# Patient Record
Sex: FEMALE | Race: OTHER | NOT HISPANIC OR LATINO | URBAN - METROPOLITAN AREA
[De-identification: names, ages, dates, MRNs, and addresses within clinical notes are randomized per-mention and may not be internally consistent; named-entity substitution may affect disease eponyms.]

---

## 2022-01-24 ENCOUNTER — EMERGENCY (EMERGENCY)
Facility: HOSPITAL | Age: 27
LOS: 1 days | Discharge: HOME | End: 2022-01-24
Attending: EMERGENCY MEDICINE
Payer: MEDICAID

## 2022-01-24 VITALS
RESPIRATION RATE: 18 BRPM | SYSTOLIC BLOOD PRESSURE: 107 MMHG | TEMPERATURE: 98 F | HEART RATE: 84 BPM | DIASTOLIC BLOOD PRESSURE: 72 MMHG | WEIGHT: 121.25 LBS | OXYGEN SATURATION: 98 %

## 2022-01-24 PROCEDURE — 99282 EMERGENCY DEPT VISIT SF MDM: CPT | Mod: 25

## 2022-01-24 PROCEDURE — 10120 INC&RMVL FB SUBQ TISS SMPL: CPT

## 2022-01-24 PROCEDURE — 99283 EMERGENCY DEPT VISIT LOW MDM: CPT | Mod: 25

## 2022-01-25 RX ORDER — BACITRACIN ZINC 500 UNIT/G
1 OINTMENT IN PACKET (EA) TOPICAL ONCE
Refills: 0 | Status: DISCONTINUED | OUTPATIENT
Start: 2022-01-25 | End: 2022-01-28

## 2022-01-25 NOTE — ED PROCEDURE NOTE - CPROC ED LOCAL ANESTHESIA1
Addended by: RADHA PATHAK on: 8/30/2021 04:29 PM     Modules accepted: Orders    
Addended by: RADHA PATHAK on: 9/9/2021 10:27 AM     Modules accepted: Orders    
1cc/1% lidocaine

## 2022-01-25 NOTE — ED PROVIDER NOTE - NSFOLLOWUPINSTRUCTIONS_ED_ALL_ED_FT
Return if you have pain, bleeding, redness, swelling any concerns.  Pt is well appearing, has no new complaints and able to walk with normal gait. Pt is stable for discharge and follow up with medical doctor. Pt educated on care and need for follow up. Discussed anticipatory guidance and return precautions. Questions answered. I had a detailed discussion with the patient and/or guardian regarding the historical points, exam findings, and any diagnostic results supporting the discharge diagnosis.

## 2022-01-25 NOTE — ED PROVIDER NOTE - CROS ED MARK PERT SYS NEG
Pee Ayala,    It was a pleasure to see you today at the [facility].     My recommendations after this visit include:    1.  Please try isosorbide mononitrate 30 mg each day.  Try this between 5 and 6 PM at night.  We want to see if this helps your significant fatigue in the early evening when you are taking the dog out.  Please call Mar Fink RN in 2 weeks to let her know if this treatment has worked.  Her phone number is [phone]    2.  If this medicine does not help at all, we will discontinue it.    3.  We will set up a follow-up in approximately 3 months.        Tres Talbert          
kerry all pertinent systems negative

## 2022-01-25 NOTE — ED PROVIDER NOTE - OBJECTIVE STATEMENT
Pt has ear ring stud stuck inside right ear lobe since yesterday morning. No fever, pt is not on anticoagulants.  Strd required removal with 1% lidocaine and small anterior incision with #11 scalpel. no complications.

## 2022-01-25 NOTE — ED PROVIDER NOTE - NSFOLLOWUPCLINICS_GEN_ALL_ED_FT
[facility]  Internal Medicine  [address] [place] [address]  Phone: [phone]  Fax: [phone]

## 2022-01-25 NOTE — ED PROVIDER NOTE - CLINICAL SUMMARY MEDICAL DECISION MAKING FREE TEXT BOX
Pt is neurovascularly intact s/p removal or earring stud.  Pt is well appearing, has no new complaints and able to walk with normal gait. Pt is stable for discharge and follow up with medical doctor. Pt educated on care and need for follow up. Discussed anticipatory guidance and return precautions. Questions answered. I had a detailed discussion with the patient and/or guardian regarding the historical points, exam findings, and any diagnostic results supporting the discharge diagnosis.

## 2022-01-25 NOTE — ED PROVIDER NOTE - PATIENT PORTAL LINK FT
You can access the FollowMyHealth Patient Portal offered by [facility] by registering at the following website: [email or web address]. By joining [employer]’s FollowMyHealth portal, you will also be able to view your health information using other applications (apps) compatible with our system.

## 2023-10-11 NOTE — ED PROVIDER NOTE - EYES, MLM
“Patient's name, , procedure and correct site were confirmed during the [place] Timeout.” “Patient's name, , procedure and correct site were confirmed during the [place] Timeout.” “Patient's name, , procedure and correct site were confirmed during the [place] Timeout.” Clear bilaterally, pupils equal, round and reactive to light.

## 2023-12-05 ENCOUNTER — EMERGENCY (EMERGENCY)
Facility: HOSPITAL | Age: 28
LOS: 1 days | Discharge: HOME | End: 2023-12-05
Attending: EMERGENCY MEDICINE
Payer: MEDICAID

## 2023-12-05 VITALS
HEART RATE: 83 BPM | TEMPERATURE: 98 F | OXYGEN SATURATION: 99 % | RESPIRATION RATE: 17 BRPM | DIASTOLIC BLOOD PRESSURE: 68 MMHG | WEIGHT: 111.99 LBS | HEIGHT: 60 IN | SYSTOLIC BLOOD PRESSURE: 100 MMHG

## 2023-12-05 VITALS — HEART RATE: 62 BPM

## 2023-12-05 PROBLEM — Z78.9 OTHER SPECIFIED HEALTH STATUS: Chronic | Status: ACTIVE | Noted: 2022-01-25

## 2023-12-05 PROCEDURE — 93005 ELECTROCARDIOGRAM TRACING: CPT

## 2023-12-05 PROCEDURE — 71046 X-RAY EXAM CHEST 2 VIEWS: CPT | Mod: 26

## 2023-12-05 PROCEDURE — 99284 EMERGENCY DEPT VISIT MOD MDM: CPT

## 2023-12-05 PROCEDURE — 99283 EMERGENCY DEPT VISIT LOW MDM: CPT | Mod: 25

## 2023-12-05 PROCEDURE — 71046 X-RAY EXAM CHEST 2 VIEWS: CPT

## 2023-12-05 NOTE — ED PROVIDER NOTE - OBJECTIVE STATEMENT
28-year-old female denies any past medical history presenting with 2 years of occasional chest pressure worse with deep breath.  States she has had similar symptoms but are worse since yesterday.  Patient went to urgent care and was told to go to ED.  Patient had EKG done there.  States her symptoms have been new onset since COVID suffered years ago.  Currently denies any shortness of breath, dizziness or nausea.  Not on any medication.  No history of travel.  No leg pain or swelling.

## 2023-12-05 NOTE — ED PROVIDER NOTE - PATIENT PORTAL LINK FT
You can access the FollowMyHealth Patient Portal offered by [facility] by registering at the following website: [email or web address]. By joining [employer]’s FollowMyHealth portal, you will also be able to view your health information using other applications (apps) compatible with our system. You can access the FollowMyHealth Patient Portal offered by [facility] by registering at the following website: [email or web address]. By joining [employer]’s FollowMyHealth portal, you will also be able to view your health information using other applications (apps) compatible with our system.

## 2023-12-05 NOTE — ED ADULT NURSE NOTE - NSFALLUNIVINTERV_ED_ALL_ED
Call bell, personal items and telephone in reach/Instruct patient to call for assistance before getting out of bed/chair/stretcher/Non-slip footwear applied when patient is off stretcher/[place] to call system/Physically safe environment - no spills, clutter or unnecessary equipment/Purposeful proactive rounding/Room/bathroom lighting operational, light cord in reach Call bell, personal items and telephone in reach/Instruct patient to call for assistance before getting out of bed/chair/stretcher/Non-slip footwear applied when patient is off stretcher/[place] to call system/Physically safe environment - no spills, clutter or unnecessary equipment/Purposeful proactive rounding/Room/bathroom lighting operational, light cord in reach

## 2023-12-05 NOTE — ED PROVIDER NOTE - CLINICAL SUMMARY MEDICAL DECISION MAKING FREE TEXT BOX
28-year-old female presenting with atypical chest symptoms.  No signs to suggest ACS or PE.  Plan to perform EKG and chest x-ray and reassess.

## 2023-12-05 NOTE — ED ADULT NURSE NOTE - OBJECTIVE STATEMENT
Pt presents to the ED with c/o chest pain x 2 days. Pt was seen in urgent care and referred to ED for evaluation.